# Patient Record
(demographics unavailable — no encounter records)

---

## 2024-11-12 NOTE — ASSESSMENT
[FreeTextEntry1] : This gentleman's chart was reviewed prior to me seeing this gentleman.  This history and physical was obtained via the  [facility] ID number [id number]  This is a 68-year-old gentleman who initially seen Dr. Woo for periumbilical discomfort.  At the time of that visit he was discovered that he had a right inguinal hernia and he was recommended to undergo his right inguinal hernia repair first since repairing the incarcerated ventral hernia would then complicate a lap inguinal approach.  Patient has been referred here for further evaluation and management.  Patient states he has pain in the mid abdomen.  This is not associate with nausea or vomiting but it is exacerbated with lifting or coughing type activities.  Patient has no nausea or vomiting no change in bowel habits he does urinate twice in the night.  Physical examination patient examined erect and supine position.  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  He has a known chronically incarcerated ventral hernia in the midline that is anywhere from 3 to 4 cm the defects are not truly appreciated due to its chronically incarcerated nature.  He has an obvious right inguinal hernia and a significant amount of laxity in his left groin and a small left inguinal hernia cannot be entirely ruled out.  Scrotum and testicles are within normal limits  Impression/plan right inguinal hernia, suspect left inguinal hernia, abdominal pain, chronically incarcerated ventral hernia: This is a 63-year-old gentleman with a known chronically incarcerated ventral hernia and he has pain right at the level of the hernia.  He is also noted incidentally to have a right inguinal hernia and possibly a left.  Given the fact that repairing the ventral hernia in an open fashion with placement of a large piece of mesh may cause difficulty or interfere with a laparoscopic inguinal hernia or approach in the future I recommend that he undergo a inguinal hernia repair followed by a ventral hernia repair.  This was conveyed to this gentleman at length using this  and this gentleman basically still wants his hernia repaired in the abdomen because that is the one causing him discomfort the right inguinal hernia does not cause him discomfort and he does not wish to address this.  Patient is known to have right inguinal hernias that will not be addressed at this operation.  Will plan to repair his ventral hernia at his request at this time.  The inguinal hernias will be addressed in the future on appearing basis.  Given the size of his hernia he will undergo a an open repair with mesh and a component separation most likely will be needed as well.  He has a very ugly looking umbilicus at this point and since his hernia encompasses portion of the umbilicus I explained to him that he is umbilicus could look terrible and likely after the surgery.  He understands this and still wants to proceed and accepting of those risks.  The indications alternatives and complication of procedure discussed questions answered written consent obtained the office today.

## 2024-11-13 NOTE — PLAN
[FreeTextEntry1] : This is a 68-year-old male with history of diabetes and osteoarthritis who was had a hernia at the central portion of his abdomen for several years.  Recently it is causing him discomfort when he lifts heavy objects at work.  He denies ever having nausea or vomiting.  He denies any recent weight loss.  He admits to getting up twice at night to urinate.  He denies constipation.  He denies blood in his stool.  He does not smoke and he denies having any issues with excessive coughing.  He is here today to be evaluated for this ventral hernia.  Review of systems: General-denies fatigue.  Cardiac- denies chest pain.  Respiratory- denies shortness of breath.  GI-denies nausea or vomiting.  -denies dysuria.  Musculoskeletal-denies back pain.  Psychiatric-denies hearing voices.  Physical exam: Head-normocephalic.  Sclera are anicteric.  Neck-supple.  Chest-clear.  Abdomen-soft, nontender, nondistended.  Extremities-no edema.  He has a clinically fat-containing ventral hernia at the central portion of his abdomen.  The defect is approximately 3 cm in size and the overlying skin is thin but noninflamed.  He also has a right inguinal hernia that is best appreciated in the standing position.  I suspect he also has a small left inguinal hernia.  He has no known drug allergies  He is on no immunomodulating or blood thinning medications.  Plan: Patient has a symptomatic ventral hernia which should be repaired.  This would best be done with mesh and may require a component separation.  He also has an asymptomatic right inguinal hernia and possibly a left.  I would recommend that these be repaired as well and the best approach would be a laparoscopic preperitoneal approach.  An open ventral hernia repair would complicate a future laparoscopic inguinal repair and therefore would recommend that he first have his inguinal hernias addressed followed by his ventral hernia.  I refer him to Dr. Lee of the hernia center.  I have explained to him the symptoms of incarceration and if that happens that he should come to the hospital and I can repair his ventral hernia urgently if that happens.  But ideally we would first fix his inguinal hernias laparoscopic and then the ventral hernia in the interval.  Significant additional time was spent because of [place] translation services being required.  In addition to the additional time there is also several instances where the  had to clarify things that were being said.  Also took extensive history, physical exam, and discussed the timing and benefits and need for surgery.  In total more than 40 minutes was spent.At the end of the visit the patient was given an appointment with Dr. Lee.

## 2025-01-19 NOTE — HISTORY OF PRESENT ILLNESS
[Preoperative Visit] : for a medical evaluation prior to surgery [Scheduled Procedure ___] : a [unfilled] [Date of Surgery ___] : on [unfilled] [Surgeon Name ___] : surgeon: [unfilled] [FreeTextEntry1] : Patient denies any hx of MI, CHF   He had HTN, HLD, DM  For 15 days, he's been having constant, central chest pain, w/o associated symptoms -. better with diclofenac He works at [employer] and lifts heavy things He also suffers from arthritis  He gets sleepy after he eats lunch

## 2025-01-19 NOTE — HISTORY OF PRESENT ILLNESS
[Preoperative Visit] : for a medical evaluation prior to surgery [Scheduled Procedure ___] : a [unfilled] [Date of Surgery ___] : on [unfilled] [Surgeon Name ___] : surgeon: [unfilled] [FreeTextEntry1] : Patient denies any hx of MI, CHF   He had HTN, HLD, DM  For 15 days, he's been having constant, central chest pain, w/o associated symptoms -. better with diclofenac He works at [employer] and lifts heavy things He also suffers from arthritis  He gets sleepy after he eats lunch

## 2025-01-19 NOTE — HISTORY OF PRESENT ILLNESS
[Preoperative Visit] : for a medical evaluation prior to surgery [Scheduled Procedure ___] : a [unfilled] [Date of Surgery ___] : on [unfilled] [Surgeon Name ___] : surgeon: [unfilled] [FreeTextEntry1] : Patient denies any hx of MI, CHF   He had HTN, HLD, DM  For 15 days, he's been having constant, central chest pain, w/o associated symptoms -. better with diclofenac He works at [employer] and lifts heavy things He also suffers from arthritis  He gets sleepy after he eats lunch

## 2025-01-19 NOTE — PHYSICAL EXAM
[Well Developed] : well developed [Well Nourished] : well nourished [No Acute Distress] : no acute distress [Normal Venous Pressure] : normal venous pressure [No Carotid Bruit] : no carotid bruit [Normal S1, S2] : normal S1, S2 [No Murmur] : no murmur [No Rub] : no rub [No Gallop] : no gallop [Clear Lung Fields] : clear lung fields [Good Air Entry] : good air entry [No Respiratory Distress] : no respiratory distress  [Soft] : abdomen soft [Non Tender] : non-tender [No Masses/organomegaly] : no masses/organomegaly [Normal Bowel Sounds] : normal bowel sounds [Normal Gait] : normal gait [No Edema] : no edema [No Cyanosis] : no cyanosis [No Clubbing] : no clubbing [No Varicosities] : no varicosities [No Rash] : no rash [No Skin Lesions] : no skin lesions [Moves all extremities] : moves all extremities [No Focal Deficits] : no focal deficits [Normal Speech] : normal speech [Alert and Oriented] : alert and oriented [Normal memory] : normal memory [General Appearance - Well Developed] : well developed [Normal Appearance] : normal appearance [Well Groomed] : well groomed [General Appearance - Well Nourished] : well nourished [No Deformities] : no deformities [General Appearance - In No Acute Distress] : no acute distress [Normal Conjunctiva] : the conjunctiva exhibited no abnormalities [Eyelids - No Xanthelasma] : the eyelids demonstrated no xanthelasmas [Normal Oral Mucosa] : normal oral mucosa [No Oral Pallor] : no oral pallor [No Oral Cyanosis] : no oral cyanosis [Normal Jugular Venous A Waves Present] : normal jugular venous A waves present [Normal Jugular Venous V Waves Present] : normal jugular venous V waves present [No Jugular Venous Antonio A Waves] : no jugular venous antonio A waves [Respiration, Rhythm And Depth] : normal respiratory rhythm and effort [Exaggerated Use Of Accessory Muscles For Inspiration] : no accessory muscle use [Auscultation Breath Sounds / Voice Sounds] : lungs were clear to auscultation bilaterally [Heart Rate And Rhythm] : heart rate and rhythm were normal [Heart Sounds] : normal S1 and S2 [Murmurs] : no murmurs present [Abdomen Soft] : soft [Abdomen Tenderness] : non-tender [Abdomen Mass (___ Cm)] : no abdominal mass palpated [Abnormal Walk] : normal gait [Gait - Sufficient For Exercise Testing] : the gait was sufficient for exercise testing [Nail Clubbing] : no clubbing of the fingernails [Cyanosis, Localized] : no localized cyanosis [Petechial Hemorrhages (___cm)] : no petechial hemorrhages [Skin Color & Pigmentation] : normal skin color and pigmentation [] : no rash [No Venous Stasis] : no venous stasis [Skin Lesions] : no skin lesions [No Skin Ulcers] : no skin ulcer [No Xanthoma] : no  xanthoma was observed [Oriented To Time, Place, And Person] : oriented to person, place, and time [Affect] : the affect was normal [Mood] : the mood was normal [No Anxiety] : not feeling anxious

## 2025-01-19 NOTE — REVIEW OF SYSTEMS
[Joint Pain] : joint pain [Negative] : Heme/Lymph [FreeTextEntry5] : see HPI [FreeTextEntry9] : knees, fingers

## 2025-01-19 NOTE — HISTORY OF PRESENT ILLNESS
[Preoperative Visit] : for a medical evaluation prior to surgery [Scheduled Procedure ___] : a [unfilled] [Date of Surgery ___] : on [unfilled] [Surgeon Name ___] : surgeon: [unfilled] [FreeTextEntry1] : Patient denies any hx of MI, CHF   He had HTN, HLD, DM  For 15 days, he's been having constant, central chest pain, w/o associated symptoms -. better with diclofenac He works at [employer] and lifts heavy things He also suffers from arthritis  He gets sleepy after he eats lunch